# Patient Record
(demographics unavailable — no encounter records)

---

## 2024-10-17 NOTE — REVIEW OF SYSTEMS
[Impotence] : impotence [Negative] : Neurological [Vision Problems] : no vision problems [Nocturia] : no nocturia [Frequency] : no frequency [FreeTextEntry2] : Lost but has gained some weight [de-identified] : Followed by psych

## 2024-10-17 NOTE — PHYSICAL EXAM
[No Acute Distress] : no acute distress [Well Nourished] : well nourished [No Edema] : there was no peripheral edema [Normal] : affect was normal and insight and judgment were intact [No CVA Tenderness] : no CVA  tenderness [No Spinal Tenderness] : no spinal tenderness [No Focal Deficits] : no focal deficits [Deep Tendon Reflexes (DTR)] : deep tendon reflexes were 2+ and symmetric [de-identified] : Sensation and motor strength normal

## 2024-10-17 NOTE — HEALTH RISK ASSESSMENT
[Fair] :  ~his/her~ mood as fair [No falls in past year] : Patient reported no falls in the past year [Nearly Every Day (3)] : 1.) Little interest or pleasure in doing things? Nearly every day [Not at All (0)] : 8.) Moving or speaking so slowly that other people could have noticed, or the opposite, moving or speaking faster than usual? Not at all [Several Days (1)] : 9.) Thoughts that you would be off dead or of hurting yourself in some way? Several days [Mild] : Severity of Depression is Mild [Not at all] : How difficult have these problems made it for you to do your work, take care of things at home, or get along with people? Not at all [Never] : Never [NO] : No [With Significant Other] : lives with significant other [Unemployed] : unemployed [Feels Safe at Home] : Feels safe at home [Fully functional (bathing, dressing, toileting, transferring, walking, feeding)] : Fully functional (bathing, dressing, toileting, transferring, walking, feeding) [Fully functional (using the telephone, shopping, preparing meals, housekeeping, doing laundry, using] : Fully functional and needs no help or supervision to perform IADLs (using the telephone, shopping, preparing meals, housekeeping, doing laundry, using transportation, managing medications and managing finances) [Reports normal functional visual acuity (ie: able to read med bottle)] : Reports normal functional visual acuity [No] : In the past 12 months have you used drugs other than those required for medical reasons? No [Medical reason not done] : Medical reason not done [de-identified] : walking [de-identified] : good [de-identified] : hx of bipolar depression [[id number]] : 9 [Reports changes in hearing] : Reports no changes in hearing [Reports changes in vision] : Reports no changes in vision [Reports changes in dental health] : Reports no changes in dental health [ColonoscopyDate] : 06/24

## 2024-10-17 NOTE — HISTORY OF PRESENT ILLNESS
[FreeTextEntry1] : Annual.  Pt started a semaglutide treatment yesterday.  [de-identified] : Mr. DAVE CARMEN is a 59 year old male here today for annual. c/o chronic bilateral leg pain since 2020 He has experienced weakness in the legs as well. He has underwent PT sessions for a little over a month and acupuncture treatments for a total of 8 sessions  and still has pain in the legs. Denies numbness or tingling. No back pain.  He has tried anti- iinflammatories without improvement.  Denies atrophy of the leg muscles.  No other chronic musculoskeletal complaints Flu: No Covid x 3

## 2024-10-21 NOTE — REASON FOR VISIT
[Home] : at home, [unfilled] , at the time of the visit. [Medical Office: ([facility])___] : at the medical office located in  [Verbal consent obtained from patient] : the patient, [unfilled] [Follow-Up] : a follow-up visit [Sleep Apnea] : sleep apnea

## 2024-10-21 NOTE — PHYSICAL EXAM
[] : no respiratory distress [Oriented To Time, Place, And Person] : oriented to person, place, and time [Impaired Insight] : insight and judgment were intact

## 2024-10-21 NOTE — HISTORY OF PRESENT ILLNESS
[FreeTextEntry1] : 59 year old male with obesity in f/u after the sleep study HST showed mild HAO with AHI 6.5, no desaturations. Most of the events where when sleeping supine AHI normal on the left side He does not have heart disease or HTN, No Hx of CVA. Has tried PAP in the past but did not tolerate it

## 2024-11-04 NOTE — PHYSICAL EXAM
[de-identified] : Constitutional: Well-developed, in no acute distress  Musculoskeletal: Lumbar Spine:   Gait: Antalgic 		Inspection: Normal curvature, no abnormal kyphosis or scoliosis 		Facet loading: pain bilaterally 		Palpation: 			Lumbar and paraspinal muscles: pain bilaterally 			Sacroiliac joint: no pain 			Greater trochanter: no pain 		Muscle Strength: 		Iliopsoas: 5/5 bilaterally 		Quadriceps: 5/5 bilaterally 		Hamstrings: 5/5 bilaterally 		Tibialis anterior: 5/5 bilaterally 		Extensor hallucis longus: 5/5 bilaterally  		Sensation: normal and equal in bilateral lower extremities  Extremity: no edema noted Neurological: Memory normal, AAO x 3, Cranial nerves II - XII grossly normal Psychiatric: Appropriate mood and affect, oriented to time, place, person, and situation

## 2024-11-04 NOTE — DATA REVIEWED
[FreeTextEntry1] : Exam Date:      10/26/24 Exam:         MRI LUMBAR SPINE   FINDINGS:  Mildly degraded by motion artifact.  L5-S1 is at image 59 of series 9. There are 5 lumbar type vertebral bodies.   Very mild lumbar dextroscoliosis with an apex near L3-L4. Very mild left lateral subluxation of L2 on L3. Lumbar alignment otherwise maintained. Visualized vertebral body heights are maintained. No acute fractures. No aggressive osseous lesions. Multilevel endplate degenerative changes.   Conus has a normal appearance and terminates at L1-L2.   Decreased T2 signal of the lumbar disks compatible with degenerative disc disease.   T12-L1: Disc bulge. No significant disc height loss. No significant spinal canal or neuroforaminal stenosis. Mild hypertrophic facet joint degeneration.   L1-L2: Disc bulge. Disc height loss. No significant spinal canal or neuroforaminal stenosis. Mild hypertrophic facet joint degeneration.   L2-L3: Disc bulge. Disc height loss. Mild spinal canal stenosis. No significant right neuroforaminal stenosis. No significant left neuroforaminal stenosis. Mild hypertrophic facet joint degeneration.   L3-L4: Disc bulge. No significant disc height loss. Very mild spinal canal stenosis. Very mild right neuroforaminal stenosis. Very mild left neuroforaminal stenosis. Moderate hypertrophic facet joint degeneration.   L4-L5: Disc bulge. Disc height loss. Mild spinal canal stenosis. Moderate to severe right neuroforaminal stenosis. Severe left neuroforaminal stenosis. Moderate hypertrophic facet joint degeneration.   L5-S1: Disc bulge. No significant disc height loss. No significant spinal canal stenosis. No significant neuroforaminal stenosis. Mild hypertrophic facet joint degeneration.   Paraspinal Soft Tissues/Retroperitoneum: Unremarkable.  ____________________  IMPRESSION:   1.  Multilevel lumbar spondylosis as detailed above with at most mild spinal canal stenosis and severe neuroforaminal stenosis.

## 2024-11-04 NOTE — HISTORY OF PRESENT ILLNESS
[Joint Pain] : joint  [___ yrs] : [unfilled] year(s) ago [4] : an average pain level of 4/10 [0] : a minimum pain level of 0/10 [7] : a maximum pain level of 7/10 [Aching] : aching [Standing] : standing [Walking] : walking [Medications] : medications [Other: ___] : [unfilled] [Paroxysmal] : paroxysmal [FreeTextEntry1] : HPI: Mr. DAVE CARMEN is a 59 year M with pmhx of anxiety/depression, HLD. Reports paroxysmal bilateral posterior thigh and shin pain. Pain level 7/10 at times and impacting his ability to sleep. No relief with PT or acupuncture. PRN NSAIDs mildly effective. Pain is impacting his quality of life and ability to perform ADL's. Denies any additional weakness, numbness, bowel/bladder dysfunction, history of bleeding disorders.  [FreeTextEntry7] : Mika  hamstrings

## 2024-11-04 NOTE — CONSULT LETTER
[Dear  ___] : Dear  [unfilled], [Consult Letter:] : I had the pleasure of evaluating your patient, [unfilled]. [Please see my note below.] : Please see my note below. [Consult Closing:] : Thank you very much for allowing me to participate in the care of this patient.  If you have any questions, please do not hesitate to contact me. [Sincerely,] : Sincerely, [FreeTextEntry3] : Mayo Norris MD

## 2024-11-04 NOTE — ASSESSMENT
[FreeTextEntry1] : 59 yom w/ severe back and bilateral leg pain.  I have personally reviewed the patient's MRI in detail and discussed my personal interpretation of the study which is significant for severe foraminal narrowing at L4-L5.  The patient has failed to have relief with medication management. The patient has failed to have relief with more than six weeks of physical therapy within the last three months. Given the patients failure to improve with all other conservative measures, recommend bilateral L5-S1 transforaminal epidural steroid injection under fluoroscopic guidance. The patient will follow-up with me in my office two weeks following intervention.  I have discussed in detail with the patient that an interventional spine procedure is associated with potential risks. The procedure may include an injection of steroid and potentially other medications (local anesthetic and normal saline) into the epidural space or surrounding tissue of the spine. There are significant risks of this procedure which include and are not limited to infection, bleeding, worsening pain, dural puncture leading to post-dural puncture headache, nerve damage, spinal cord injury, paralysis, stroke, and death. There is a chance that the procedure does not improve their pain. There are risks associated with the steroid being absorbed into the body systemically. These include dysphoria, difficulty sleeping, mood swings, and personality changes. Pre-menopausal women may notice a regularity his in her menstrual cycle for 2-3 months following the injection. Steroids can specifically affect patients with hypertension, diabetes, and peptic ulcers. The procedure may cause a temporary increase in blood pressure and blood glucose, and may adversely affect a peptic ulcer. Other, more rare complications, including avascular necrosis of the joints, glaucoma, and osteoporosis. I have discussed the risks of the procedure at length with the patient, and the potential benefits of pain relief. I have offered alternatives to the procedure. All questions were answered. The patient expressed understanding and wishes to proceed with the procedure.  Physical therapy prescribed - goal will be to increase ROM, strengthening, postural training, other modalities ad birgit which may include massage and stim. Goals of therapy discussed with the patient in detail and will be discussed with physical therapist. Patient will follow-up following course of physical therapy to monitor progress and adjust therapy as needed.  Acetaminophen 1,000 mg q8h prn for moderate pain. Risks, benefits, and alternatives of acetaminophen discussed with patient.  Ibuprofen 600 mg q8h prn add when pain is not adequately controlled with acetaminophen. Risks, benefits, and alternatives of ibuprofen discussed with patient.  Diet and nutritional strategies discussed which may improve patients pain and will improve overall health.

## 2024-11-18 NOTE — PROCEDURE
[FreeTextEntry1] : PROCEDURE: Bilateral L5-S1 transforaminal epidural steroid injection under fluoroscopic guidance.  CHIEF COMPLAINT: Low back and leg pain.    PREOPERATIVE DIAGNOSIS:	Lumbar radiculopathy.  POSTOPERATIVE DIAGNOSIS:  Lumbar radiculopathy. 	  ANESTHESIA:  Local.  COMPLICATIONS:  	None.  ESTIMATED BLOOD LOSS:  None.	  INDICATIONS: Mr. Brady is a very pleasant 59 yom who has significant low back and leg pain.  The patient has failed to have relief with medication management and physical therapy. Given their failure to improve with all other conservative measures, it was determined that the patient would benefit from a transforaminal epidural steroid injection under fluoroscopic guidance.    The patient denies any fevers, chills, nausea, vomiting, diarrhea, constipation, chest pain, shortness of breath, or burning on urination.  They deny any latex allergy.  They are not taking any anticoagulants and do not have a history of coagulopathy.  The patient denies any IV contrast allergy.       PROCEDURE COURSE: The risks, benefits, and alternatives of the lumbar interlaminar epidural steroid injection were explained to the patient.  All questions were answered to their own satisfaction.  Written consent was signed and placed in the chart. The patients low back was marked in the preoperative holding area.   The patient was brought to the Operating Room and placed in the prone position with a pillow under the abdomen to reduce lumbar lordosis.  A time-out was taken identifying the patient, the procedure, the site and side, and the patients allergies.  ASA standard monitors were applied for monitoring throughout the procedure.  The patients back was prepped and draped with Chloroprep in the usual sterile fashion and sterile technique was adhered to throughout the entire procedure.  The lumbar spine was visualized under fluoroscopy in the AP view. The 12th rib and T12 vertebra were identified as a reference point and the lumbar vertebral bodies were counted.  The L5 vertebral body was then squared. The vertebral body and the superior and inferior end plates were aligned and the spinous processes were adjusted until midline. The fluoroscope was then obliqued approximately twenty degrees to the side of the target foramen and the L5-S1 foramen was identified under fluoroscopic guidance. The skin and subcutaneous tissues were infiltrated with 1% Lidocaine.  After adequate local anesthesia was obtained, a 22g needle was advanced toward the foramen. The needle was carefully advanced, alternating between AP and lateral views, to ensure appropriate trajectory and depth. The needle was advanced just under the pedicle and this was confirmed on lateral view. Once the foramen was accessed, negative aspiration for blood and CSF was obtained, further confirming location. Following this, 1 cc of contrast was administered and epidural spread was confirmed in the AP and lateral views. Following this, 7.5 mg of dexamethasone and 1 cc of 1% lidocaine was slowly administered in incremental amounts.  Following this attention was turned to the right L5-S1 foramen. The same exact sequence of events was performed and the same exact medications were administered.   All injections were done with negative aspiration for cerebrospinal fluid or heme, and all needles were withdrawn without incident. I personally met with the patient following the procedure and all questions were answered. The patient tolerated the procedure well without any apparent complications and was transferred to the Recovery Room in stable condition. The patient was provided with discharge instructions and will follow-up with me in my office.  	___________________________________ 	Mayo Norris M.D.

## 2024-12-04 NOTE — ASSESSMENT
[FreeTextEntry1] : 59 yom w/ severe back and bilateral leg pain which has improved after NICCI.  I have personally reviewed the patient's MRI in detail and discussed my personal interpretation of the study which is significant for severe foraminal narrowing at L4-L5.  Physical therapy prescribed - goal will be to increase ROM, strengthening, postural training, other modalities ad birgit which may include massage and stim. Goals of therapy discussed with the patient in detail and will be discussed with physical therapist. Patient will follow-up following course of physical therapy to monitor progress and adjust therapy as needed.  Acetaminophen 1,000 mg q8h prn for moderate pain. Risks, benefits, and alternatives of acetaminophen discussed with patient.  Ibuprofen 600 mg q8h prn add when pain is not adequately controlled with acetaminophen. Risks, benefits, and alternatives of ibuprofen discussed with patient.  Diet and nutritional strategies discussed which may improve patients pain and will improve overall health.  I explained to patient benefits and limitation of TeleMedicine visits. Patient understands that limitations include inability to perform comprehensive physical exam, which may lead to potential diagnostic inconsistencies.  Patient understands that diagnosis and treatment may be limited by these inconsistencies and patient agrees to proceed with care plan

## 2024-12-04 NOTE — HISTORY OF PRESENT ILLNESS
[Joint Pain] : joint  [___ yrs] : [unfilled] year(s) ago [Paroxysmal] : paroxysmal [4] : an average pain level of 4/10 [0] : a minimum pain level of 0/10 [7] : a maximum pain level of 7/10 [Aching] : aching [Standing] : standing [Walking] : walking [Medications] : medications [Other: ___] : [unfilled] [FreeTextEntry1] : Verbal consent was given by/on: Dave Brady on 12/04/24  Patient location: Home, [place], [place]  Physician location: Office, [place], [place]  Reason for Telehealth visit: Back pain  He has had relief after his NICCI.  Pain is better when standing and walking. He does have pain when reading in bed. Quality of life is impaired. There has been a severe exacerbation of the patient's chronic pain. Is happy with his results.   This service took place using a two way audio and visual platform. The patient and Dr. Norris were both able to see each other and communicate through video. There were no barriers to communication. Greater then 50% of the time spent in the encounter involved counseling and coordination of care.   Time spent on visit: 30 minutes  HPI: Mr. DAVE BRADY is a 59 year M with pmhx of anxiety/depression, HLD. Reports paroxysmal bilateral posterior thigh and shin pain. Pain level 7/10 at times and impacting his ability to sleep. No relief with PT or acupuncture. PRN NSAIDs mildly effective. Pain is impacting his quality of life and ability to perform ADL's. Denies any additional weakness, numbness, bowel/bladder dysfunction, history of bleeding disorders.  Interventions: Bilateral L5-S1 TFESI (11/18/24):  [FreeTextEntry7] : Mika  hamstrings

## 2024-12-04 NOTE — PHYSICAL EXAM
[de-identified] : Constitutional: Well-developed, in no acute distress  Psychiatric: Appropriate mood and affect, oriented to time, place, person, and situation